# Patient Record
Sex: FEMALE | Race: BLACK OR AFRICAN AMERICAN | NOT HISPANIC OR LATINO | Employment: UNEMPLOYED | URBAN - METROPOLITAN AREA
[De-identification: names, ages, dates, MRNs, and addresses within clinical notes are randomized per-mention and may not be internally consistent; named-entity substitution may affect disease eponyms.]

---

## 2022-08-07 ENCOUNTER — APPOINTMENT (OUTPATIENT)
Dept: RADIOLOGY | Facility: MEDICAL CENTER | Age: 57
End: 2022-08-07
Attending: EMERGENCY MEDICINE
Payer: COMMERCIAL

## 2022-08-07 ENCOUNTER — HOSPITAL ENCOUNTER (EMERGENCY)
Facility: MEDICAL CENTER | Age: 57
End: 2022-08-07
Attending: EMERGENCY MEDICINE
Payer: COMMERCIAL

## 2022-08-07 VITALS
SYSTOLIC BLOOD PRESSURE: 127 MMHG | WEIGHT: 150 LBS | DIASTOLIC BLOOD PRESSURE: 71 MMHG | TEMPERATURE: 99 F | HEART RATE: 53 BPM | OXYGEN SATURATION: 96 % | RESPIRATION RATE: 16 BRPM

## 2022-08-07 DIAGNOSIS — N63.0 BREAST MASS IN FEMALE: ICD-10-CM

## 2022-08-07 LAB
ANION GAP SERPL CALC-SCNC: 11 MMOL/L (ref 7–16)
BASOPHILS # BLD AUTO: 0.2 % (ref 0–1.8)
BASOPHILS # BLD: 0.01 K/UL (ref 0–0.12)
BLOOD CULTURE HOLD CXBCH: NORMAL
BUN SERPL-MCNC: 22 MG/DL (ref 8–22)
CALCIUM SERPL-MCNC: 8.8 MG/DL (ref 8.5–10.5)
CHLORIDE SERPL-SCNC: 106 MMOL/L (ref 96–112)
CO2 SERPL-SCNC: 23 MMOL/L (ref 20–33)
CREAT SERPL-MCNC: 0.75 MG/DL (ref 0.5–1.4)
EOSINOPHIL # BLD AUTO: 0.05 K/UL (ref 0–0.51)
EOSINOPHIL NFR BLD: 1.1 % (ref 0–6.9)
ERYTHROCYTE [DISTWIDTH] IN BLOOD BY AUTOMATED COUNT: 48.8 FL (ref 35.9–50)
GFR SERPLBLD CREATININE-BSD FMLA CKD-EPI: 94 ML/MIN/1.73 M 2
GLUCOSE SERPL-MCNC: 77 MG/DL (ref 65–99)
HCT VFR BLD AUTO: 30.4 % (ref 37–47)
HGB BLD-MCNC: 9.7 G/DL (ref 12–16)
IMM GRANULOCYTES # BLD AUTO: 0.01 K/UL (ref 0–0.11)
IMM GRANULOCYTES NFR BLD AUTO: 0.2 % (ref 0–0.9)
LYMPHOCYTES # BLD AUTO: 1.26 K/UL (ref 1–4.8)
LYMPHOCYTES NFR BLD: 26.8 % (ref 22–41)
MCH RBC QN AUTO: 28.7 PG (ref 27–33)
MCHC RBC AUTO-ENTMCNC: 31.9 G/DL (ref 33.6–35)
MCV RBC AUTO: 89.9 FL (ref 81.4–97.8)
MONOCYTES # BLD AUTO: 0.41 K/UL (ref 0–0.85)
MONOCYTES NFR BLD AUTO: 8.7 % (ref 0–13.4)
NEUTROPHILS # BLD AUTO: 2.96 K/UL (ref 2–7.15)
NEUTROPHILS NFR BLD: 63 % (ref 44–72)
NRBC # BLD AUTO: 0 K/UL
NRBC BLD-RTO: 0 /100 WBC
PLATELET # BLD AUTO: 260 K/UL (ref 164–446)
PMV BLD AUTO: 10.5 FL (ref 9–12.9)
POTASSIUM SERPL-SCNC: 4.1 MMOL/L (ref 3.6–5.5)
RBC # BLD AUTO: 3.38 M/UL (ref 4.2–5.4)
SODIUM SERPL-SCNC: 140 MMOL/L (ref 135–145)
WBC # BLD AUTO: 4.7 K/UL (ref 4.8–10.8)

## 2022-08-07 PROCEDURE — 93971 EXTREMITY STUDY: CPT | Mod: RT

## 2022-08-07 PROCEDURE — 700102 HCHG RX REV CODE 250 W/ 637 OVERRIDE(OP): Performed by: EMERGENCY MEDICINE

## 2022-08-07 PROCEDURE — 85025 COMPLETE CBC W/AUTO DIFF WBC: CPT

## 2022-08-07 PROCEDURE — 80048 BASIC METABOLIC PNL TOTAL CA: CPT

## 2022-08-07 PROCEDURE — 36415 COLL VENOUS BLD VENIPUNCTURE: CPT

## 2022-08-07 PROCEDURE — A9270 NON-COVERED ITEM OR SERVICE: HCPCS | Performed by: EMERGENCY MEDICINE

## 2022-08-07 PROCEDURE — 99284 EMERGENCY DEPT VISIT MOD MDM: CPT

## 2022-08-07 RX ORDER — CEPHALEXIN 500 MG/1
500 CAPSULE ORAL ONCE
Status: COMPLETED | OUTPATIENT
Start: 2022-08-07 | End: 2022-08-07

## 2022-08-07 RX ORDER — RISPERIDONE 2 MG/1
2 TABLET ORAL 2 TIMES DAILY
COMMUNITY

## 2022-08-07 RX ORDER — CEPHALEXIN 500 MG/1
500 CAPSULE ORAL 4 TIMES DAILY
Qty: 28 CAPSULE | Refills: 0 | Status: SHIPPED | OUTPATIENT
Start: 2022-08-07 | End: 2022-08-14

## 2022-08-07 RX ORDER — CEPHALEXIN 500 MG/1
500 CAPSULE ORAL 4 TIMES DAILY
Qty: 28 CAPSULE | Refills: 0 | Status: SHIPPED | OUTPATIENT
Start: 2022-08-07 | End: 2022-08-07 | Stop reason: SDUPTHER

## 2022-08-07 RX ADMIN — CEPHALEXIN 500 MG: 500 CAPSULE ORAL at 18:05

## 2022-08-07 NOTE — ED PROVIDER NOTES
"  ED Provider Note    Scribed for Cely Dey M.D. by Kaylee Potter. 8/7/2022  4:07 PM    Primary care provider: None noted  Means of arrival: Police escort  History obtained from: Patient  History limited by: Poor historian and mental illness    CHIEF COMPLAINT  Chief Complaint   Patient presents with   • Wound Check   • Breast Mass     Pt c/o right breast mass/wound    • Arm Swelling     Right arm/hand swelling        HPI  Audie Martin is a 57 y.o. female who presents via Police escort in [facility], for evaluation of progressively worsening right arm and hand swelling onset 2 weeks ago. The patient has a notable history of Bipolar disorder, but denies taking any daily medications. She has been homeless for many years, until 2 weeks ago when she was taken in by [facility] facility. During a health examination by police 2 weeks ago, a right breast mass was noted, which has been known by the patient. Right breast mass is presumably cancer, onset 9 years ago. The patient denied treatment for the mass multiple times in the past, as well as today. She has some associated itchiness and drainage from the right breast, which has been treated with Benadryl cream by [facility]. There has been no alleviation of symptoms, and patient refuses to keep the bandage on her right breast. When asked if she has a history of cancer or family history she does not answer. She has some associated pain to the right arm up to the shoulder. When asked if she is experiencing any loss of appetite she states that she \"likes Doritos\". She is an otherwise poor historian, not answering questions appropriately when asked. She is able to deny any symptoms of cough, fever, weight loss, vomiting, headache, or abdominal pain. She also admits to having hearing problems.        HPI limited to patient being a poor historian and mental illness.     REVIEW OF SYSTEMS  Pertinent positives include right breat mass with itching and drainage, " right arm swelling, right hand swelling, and right arm pain.   Pertinent negatives include no cough, vomiting, fever, weight loss, headache, or abdominal pain.   See HPI for further details. All other systems are negative.  ROS limited to patient being a poor historian.    PAST MEDICAL HISTORY  Past Medical History:   Diagnosis Date   • Bipolar 1 disorder (HCC)    • Psychosis (HCC)        FAMILY HISTORY  History reviewed. No pertinent family history.    SOCIAL HISTORY  Social History     Tobacco Use   • Smoking status: Never Smoker   • Smokeless tobacco: Never Used   Vaping Use   • Vaping Use: Never used   Substance Use Topics   • Alcohol use: Never   • Drug use: Yes     Comment: thc      Social History     Substance and Sexual Activity   Drug Use Yes    Comment: thc       SURGICAL HISTORY  History reviewed. No pertinent surgical history.    CURRENT MEDICATIONS  Home Medications     Reviewed by Jacey Wilburn R.N. (Registered Nurse) on 08/07/22 at 1557  Med List Status: Partial   Medication Last Dose Status        Patient Sergo Taking any Medications                       ALLERGIES  Allergies   Allergen Reactions   • Penicillin G        PHYSICAL EXAM  VITAL SIGNS: BP (!) 152/85   Pulse 60   Temp 37.3 °C (99.2 °F) (Temporal)   Resp 16   Wt 68 kg (150 lb)   SpO2 94%    Constitutional: Well developed, well nourished; No acute distress; Non-toxic appearance.   HENT: Normocephalic, atraumatic; Bilateral external ears normal; Oropharynx with moist mucous membranes; No erythema or exudates in the posterior oropharynx.   Eyes: PERRL, EOMI, Conjunctiva normal. No discharge.   Neck:  Supple, nontender midline; No stridor; No nuchal rigidity.   Lymphatic: Hard non mobile right axillary adenopathy. No cervical lymphadenopathy noted.   Cardiovascular: Regular rate and rhythm without murmurs, rubs, or gallop.   Thorax & Lungs: Hard nodular mass encompassing majority of right breast with a small amount of drainage coming out  of the 7 o clock position. No respiratory distress, breath sounds clear to auscultation bilaterally without wheezing, rales or rhonchi. Nontender chest wall. No crepitus or subcutaneous air  Abdomen: Soft, nontender, bowel sounds normal. No obvious masses; No pulsatile masses; no rebound, guarding, or peritoneal signs.   Skin: Good color; warm and dry without rash or petechia.  Back: Nontender, No CVA tenderness.   Extremities: Soft and painless edema to the hand and majority of the forearm. No swelling to the upper arm. 2+ pedal pulses. Full ROM to the digits. Good capillary refill. Subtle pealing over the dorsalis surfaces of the index finger Distal radial, dorsalis pedis, posterior tibial pulses are equal bilaterally; Nontender calves or saphenous, No cyanosis, No clubbing.   Musculoskeletal: Good range of motion in all major joints. No tenderness to palpation or major deformities noted.   Neurologic: Alert & oriented x 4, clear speech.       LABS/RADIOLOGY/PROCEDURES  Results for orders placed or performed during the hospital encounter of 08/07/22   CBC WITH DIFFERENTIAL   Result Value Ref Range    WBC 4.7 (L) 4.8 - 10.8 K/uL    RBC 3.38 (L) 4.20 - 5.40 M/uL    Hemoglobin 9.7 (L) 12.0 - 16.0 g/dL    Hematocrit 30.4 (L) 37.0 - 47.0 %    MCV 89.9 81.4 - 97.8 fL    MCH 28.7 27.0 - 33.0 pg    MCHC 31.9 (L) 33.6 - 35.0 g/dL    RDW 48.8 35.9 - 50.0 fL    Platelet Count 260 164 - 446 K/uL    MPV 10.5 9.0 - 12.9 fL    Neutrophils-Polys 63.00 44.00 - 72.00 %    Lymphocytes 26.80 22.00 - 41.00 %    Monocytes 8.70 0.00 - 13.40 %    Eosinophils 1.10 0.00 - 6.90 %    Basophils 0.20 0.00 - 1.80 %    Immature Granulocytes 0.20 0.00 - 0.90 %    Nucleated RBC 0.00 /100 WBC    Neutrophils (Absolute) 2.96 2.00 - 7.15 K/uL    Lymphs (Absolute) 1.26 1.00 - 4.80 K/uL    Monos (Absolute) 0.41 0.00 - 0.85 K/uL    Eos (Absolute) 0.05 0.00 - 0.51 K/uL    Baso (Absolute) 0.01 0.00 - 0.12 K/uL    Immature Granulocytes (abs) 0.01 0.00 -  0.11 K/uL    NRBC (Absolute) 0.00 K/uL   BASIC METABOLIC PANEL   Result Value Ref Range    Sodium 140 135 - 145 mmol/L    Potassium 4.1 3.6 - 5.5 mmol/L    Chloride 106 96 - 112 mmol/L    Co2 23 20 - 33 mmol/L    Glucose 77 65 - 99 mg/dL    Bun 22 8 - 22 mg/dL    Creatinine 0.75 0.50 - 1.40 mg/dL    Calcium 8.8 8.5 - 10.5 mg/dL    Anion Gap 11.0 7.0 - 16.0   Blood Culture,Hold   Result Value Ref Range    Blood Culture Hold Collected    ESTIMATED GFR   Result Value Ref Range    GFR (CKD-EPI) 94 >60 mL/min/1.73 m 2         US-EXTREMITY VENOUS UPPER UNILAT RIGHT   Final Result      US neg for DVT    COURSE & MEDICAL DECISION MAKING  Pertinent Labs & Imaging studies reviewed. (See chart for details)      4:07 PM - Patient seen and examined at bedside. Discussed plan of care, including US to rule out possible clots. Patient agrees to the plan of care. Ordered for US-extremity upper right, CBC with differential, and Basic metabolic panel to evaluate her symptoms.     5:50 PM I discussed the patient's case and the above findings with Pharmacy who advises I trial PO Keflex here.  If tolerated, discharge her with regimen.      5:59 PM - Patient and Police reevaluated at bedside. Discussed non concerning labs and imaging. Discussed plan for discharge; I advised the patient to undergo immediate follow-up with [facility] as soon as possible, and to return to the [facility] ED with any new or worsening symptoms. Patient informed she would be discharged with a Keflex regimen to combat signs of infection to the right breast mass. Patient was given the opportunity for questions. I addressed all questions or concerns at this time and they verbalize agreement to the plan of care.     6:17 PM- Jacey RN here at [facility], contacted the nurse over at [facility].  She says the patient has a known breast cancer.  She came to them from [place] a few weeks ago.  Down in [place] she was evaluated for breast cancer treatment but  refused all treatment.  They are most concerned about the fact that her left hand has been swelling up over the last couple weeks and that she is developed a little bit of drainage at the breast mass.  They are worried about infection.  They say that they will try to see if she will be willing to follow-up with her breast center, but patient has refused all treatment for her known advanced stage IV breast cancer and and feel that patient will likely continue to refuse treatment.    Patient presents to the ER from [facility] in the care of law enforcement.  She arrives in four-point handcuffs with 2 officers.  They sent the patient to the ER for concern over some swelling to her right hand and forearm which is going on over the last 2 weeks.  They are also concerned about some drainage that she is developed from the known right breast cancer mass on the right breast.  Patient has stage IV breast cancer.  This is a known cancer.  She is refused all breast cancer treatment.  She has severe mental illness and is currently at [facility].  They were concerned about the arm swelling and wanted to rule out DVT.  They also were concerned that she was getting infection with the drainage she was experiencing from the mass.  There is a small amount of bloody drainage coming from the 7 o'clock position of this large very obvious breast mass from her stage IV breast cancer.  It does not appear to be foul-smelling or purulent.  She will go home on Keflex.  She tolerated Keflex here in the ER without any signs of allergic reaction.  With respect to the right upper extremity swelling, I think this is secondary to lymphedema.  Her ultrasound is negative.  She does not have any pain in the hand or wrist.  No tenderness in the hand or wrist with palpation.  She is got good pulse.  I think she might benefit from a compression sleeve for lymphedema.  I have referred the patient to the breast center as well as to primary care.   Nursing staff at [facility] says that they are not confident the patient will agree to go to breast center as she is refused all treatment for her stage IV breast cancer thus far.  However, they will try to see if she will go.  Referrals have been placed.  Patient's vital signs are normal stable.  She is not in any distress.  She is not septic or toxic.  I think she is safe and stable for outpatient management and for discharge back to [facility].  She has been given strict return precautions and discharge instructions.  Medical staff at [facility] understand treatment plan and follow-up.    The patient will return for new or worsening symptoms and is stable at the time of discharge.    The patient is referred to a primary physician for blood pressure management, diabetic screening, and for all other preventative health concerns.    DISPOSITION:  Patient will be discharged home in stable condition.    FOLLOW UP:  [facility]  [address]  [place] [address]  Schedule an appointment as soon as possible for a visit in 2 days  If symptoms worsen return to ER    [facility] [address] [facility]  [address], [address]  [place] [id number] [phone]  Schedule an appointment as soon as possible for a visit in 2 days  If symptoms worsen return to ER      OUTPATIENT MEDICATIONS:  New Prescriptions    CEPHALEXIN (KEFLEX) 500 MG CAP    Take 1 Capsule by mouth 4 times a day for 7 days.     FINAL IMPRESSION  1. Breast mass in female Acute       Kaylee JIMENEZ), am scribing for, and in the presence of, Cely Dey M.D..    Electronically signed by: Kaylee Pulliam), 8/7/2022    Cely JIMENEZ M.D. personally performed the services described in this documentation, as scribed by Kaylee Potter in my presence, and it is both accurate and complete.    This dictation has been created using voice recognition software. The accuracy of the dictation is limited by  the abilities of the software. I expect there may be some errors of grammar and possibly content. I made every attempt to manually correct the errors within my dictation. However, errors related to voice recognition software may still exist and should be interpreted within the appropriate context.    The note accurately reflects work and decisions made by me.  Cely Dey M.D.  8/8/2022  1:30 AM

## 2022-08-08 NOTE — ED NOTES
Luis called and BEE villafuerte notified of MD plan for pt follow up. IV access removed - bandage applied. Wristband removed per protocol. All belongings accounted for. Pt discharged with guards

## 2022-08-08 NOTE — DISCHARGE INSTRUCTIONS
Patient will need to follow-up at the [facility] primary care office to establish a primary care physician.  A referral has been placed on her behalf.  She will also need to follow-up at the [facility] for what is likely a very advanced breast cancer of the right breast.  A referral has been placed on her behalf at the [facility] as well.  If you do not hear from the [facility] schedulers within the next several days, please call for appointment times.    Return to the ER for any drainage of pus from the breast, for redness around the breast, fevers over 100.4, shaking chills, nausea, vomiting, worsening swelling to the arm or hand, pain in the arm or hand, chest pains, cough, coughing up phlegm or blood, shortness of breath, or for any concerns.

## 2022-08-12 ENCOUNTER — TELEPHONE (OUTPATIENT)
Dept: HEMATOLOGY ONCOLOGY | Facility: MEDICAL CENTER | Age: 57
End: 2022-08-12
Payer: COMMERCIAL

## 2022-08-12 NOTE — TELEPHONE ENCOUNTER
Mickey Ann RN nurse navigation, regarding pt upcoming appointment with Dr. Parrish. Reviewed pt recent history with [facility]. RN confirmed will discuss with team for upcoming appointment.

## 2022-08-30 ENCOUNTER — HOSPITAL ENCOUNTER (OUTPATIENT)
Dept: HEMATOLOGY ONCOLOGY | Facility: MEDICAL CENTER | Age: 57
End: 2022-08-30
Attending: INTERNAL MEDICINE
Payer: COMMERCIAL

## 2022-08-30 VITALS
DIASTOLIC BLOOD PRESSURE: 98 MMHG | WEIGHT: 145.72 LBS | TEMPERATURE: 99.1 F | RESPIRATION RATE: 18 BRPM | HEIGHT: 69 IN | SYSTOLIC BLOOD PRESSURE: 121 MMHG | OXYGEN SATURATION: 100 % | HEART RATE: 95 BPM | BODY MASS INDEX: 21.58 KG/M2

## 2022-08-30 DIAGNOSIS — C50.911: ICD-10-CM

## 2022-08-30 DIAGNOSIS — C77.9: ICD-10-CM

## 2022-08-30 PROCEDURE — 99204 OFFICE O/P NEW MOD 45 MIN: CPT | Performed by: INTERNAL MEDICINE

## 2022-08-30 PROCEDURE — 99212 OFFICE O/P EST SF 10 MIN: CPT | Performed by: INTERNAL MEDICINE

## 2022-08-30 ASSESSMENT — PAIN SCALES - GENERAL: PAINLEVEL: 10=SEVERE PAIN

## 2022-08-31 DIAGNOSIS — C77.9: ICD-10-CM

## 2022-08-31 DIAGNOSIS — C50.911: ICD-10-CM

## 2022-09-01 NOTE — PROGRESS NOTES
Consult:  Hematology/Oncology 8/31/22      Referring Physician:  Emergency Department  Primary Care:  No primary care provider on file.    Diagnosis: Locally advanced right breast cancer    Chief Complaint:  Growing bleeding mass in right breast    History of Presenting Illness:  Jammie Peoples is a 57 y.o. black female who presented to the emergency room on 8/7/2022 bleeding right breast mass.  This mass had been present for several years at least.  And had recently began to bleed and cause swelling of her arm.  The patient has been homeless for many years until late July when she was taken to [facility].  She is being evaluated by psychiatry and by the legal authorities to decide her ultimate disposition.  She does have a long history of bipolar disorder but is not on any medications currently.  Laboratory in the emergency room showed normal chromic normocytic anemia but was otherwise essentially normal.  She was given Keflex and referred to us.  She continues to have intermittent bleeding from the right breast mass.  She notes that her right breast has been almost completely consumed by the tumor.  A biopsy has not yet been done.      Past Medical History:   Diagnosis Date    Bipolar 1 disorder (HCC)     Psychosis (HCC)        No past surgical history on file.    Social History     Tobacco Use    Smoking status: Never    Smokeless tobacco: Never   Vaping Use    Vaping Use: Never used   Substance Use Topics    Alcohol use: Never    Drug use: Yes     Comment: thc        No family history on file.    Allergies as of 08/30/2022 - Reviewed 08/30/2022   Allergen Reaction Noted    Penicillin g  08/07/2022         Current Outpatient Medications:     risperiDONE (RISPERDAL) 2 MG Tab, Take 2 mg by mouth 2 times a day., Disp: , Rfl:     Review of Systems:  Review of Systems   Constitutional: Negative.    HENT: Negative.     Eyes: Negative.    Respiratory: Negative.     Cardiovascular: Negative.   "  Gastrointestinal: Negative.    Genitourinary: Negative.    Musculoskeletal: Negative.         Swelling of the right arm   Skin: Negative.    Neurological:  Positive for headaches.   Endo/Heme/Allergies: Negative.    Psychiatric/Behavioral:  The patient is nervous/anxious.         Physical Exam:  Vitals:    08/30/22 1406   BP: (!) 121/98   Pulse: 95   Resp: 18   Temp: 37.3 °C (99.1 °F)   TempSrc: Temporal   SpO2: 100%   Weight: 66.1 kg (145 lb 11.6 oz)   Height: 1.753 m (5' 9\")       DESC; KARNOFSKY SCALE WITH ECOG EQUIVALENT: 50, Requires considerable assistance and frequent medical care (ECOG equivalent 2)    DISTRESS LEVEL: moderate distress    Physical Exam  Constitutional:       Appearance: Normal appearance. She is ill-appearing.      Comments: She is somewhat agitated and arrives in handcuffs and leg cuffs accompanied by facility officers   HENT:      Head: Normocephalic.      Mouth/Throat:      Mouth: Mucous membranes are moist.      Pharynx: Oropharynx is clear.   Eyes:      Extraocular Movements: Extraocular movements intact.      Conjunctiva/sclera: Conjunctivae normal.      Pupils: Pupils are equal, round, and reactive to light.   Cardiovascular:      Rate and Rhythm: Normal rate and regular rhythm.      Pulses: Normal pulses.   Pulmonary:      Effort: Pulmonary effort is normal.      Breath sounds: Normal breath sounds.   Chest:   Breasts:     Left: Normal.      Comments: Right breast is almost completely autolyzed with mass overlying the right breast losing a small amount of blood necrotic debris.  There is a fixed right axillary mass with swelling of the right proximal arm and to a lesser extent the distal arm.  Right supraclavicular adenopathy is noted multiple skin nodules are noted consistent with metastatic disease.  Abdominal:      General: Abdomen is flat. Bowel sounds are normal.      Palpations: Abdomen is soft. There is no mass.   Musculoskeletal:         General: Normal range of motion. "   Skin:     General: Skin is warm.   Neurological:      General: No focal deficit present.      Mental Status: She is alert and oriented to person, place, and time.   Psychiatric:         Mood and Affect: Mood normal.         Behavior: Behavior normal.         Labs:  Admission on 08/07/2022, Discharged on 08/07/2022   Component Date Value Ref Range Status    WBC 08/07/2022 4.7 (A) 4.8 - 10.8 K/uL Final    RBC 08/07/2022 3.38 (A) 4.20 - 5.40 M/uL Final    Hemoglobin 08/07/2022 9.7 (A) 12.0 - 16.0 g/dL Final    Hematocrit 08/07/2022 30.4 (A) 37.0 - 47.0 % Final    MCV 08/07/2022 89.9  81.4 - 97.8 fL Final    MCH 08/07/2022 28.7  27.0 - 33.0 pg Final    MCHC 08/07/2022 31.9 (A) 33.6 - 35.0 g/dL Final    RDW 08/07/2022 48.8  35.9 - 50.0 fL Final    Platelet Count 08/07/2022 260  164 - 446 K/uL Final    MPV 08/07/2022 10.5  9.0 - 12.9 fL Final    Neutrophils-Polys 08/07/2022 63.00  44.00 - 72.00 % Final    Lymphocytes 08/07/2022 26.80  22.00 - 41.00 % Final    Monocytes 08/07/2022 8.70  0.00 - 13.40 % Final    Eosinophils 08/07/2022 1.10  0.00 - 6.90 % Final    Basophils 08/07/2022 0.20  0.00 - 1.80 % Final    Immature Granulocytes 08/07/2022 0.20  0.00 - 0.90 % Final    Nucleated RBC 08/07/2022 0.00  /100 WBC Final    Neutrophils (Absolute) 08/07/2022 2.96  2.00 - 7.15 K/uL Final    Includes immature neutrophils, if present.    Lymphs (Absolute) 08/07/2022 1.26  1.00 - 4.80 K/uL Final    Monos (Absolute) 08/07/2022 0.41  0.00 - 0.85 K/uL Final    Eos (Absolute) 08/07/2022 0.05  0.00 - 0.51 K/uL Final    Baso (Absolute) 08/07/2022 0.01  0.00 - 0.12 K/uL Final    Immature Granulocytes (abs) 08/07/2022 0.01  0.00 - 0.11 K/uL Final    NRBC (Absolute) 08/07/2022 0.00  K/uL Final    Sodium 08/07/2022 140  135 - 145 mmol/L Final    Potassium 08/07/2022 4.1  3.6 - 5.5 mmol/L Final    Chloride 08/07/2022 106  96 - 112 mmol/L Final    Co2 08/07/2022 23  20 - 33 mmol/L Final    Glucose 08/07/2022 77  65 - 99 mg/dL Final    Bun  2022 22  8 - 22 mg/dL Final    Creatinine 2022 0.75  0.50 - 1.40 mg/dL Final    Calcium 2022 8.8  8.5 - 10.5 mg/dL Final    Anion Gap 2022 11.0  7.0 - 16.0 Final    Blood Culture Hold 2022 Collected   Final    GFR (CKD-EPI) 2022 94  >60 mL/min/1.73 m 2 Final    Comment: Effective 3/15/2022, estimated Glomerular Filtration Rate  is calculated using race neutral CKD-EPI  equation  per NKF-ASN recommendations.         Imaging:   All listed images below have been independently reviewed by me. I agree with the findings as summarized below:    US-EXTREMITY VENOUS UPPER UNILAT RIGHT    Result Date: 2022   Upper Extremity  Venous Duplex Report  Vascular Laboratory  CONCLUSIONS  No deep or superficial venous abnormalities in the right arm.  Lymphadenopathy.  RVBROCKELVA, ROMIE  Exam Date:     2022 16:43  Room #:     Inpatient  Priority:     Stat  Ht (in):             Wt (lb):  Ordering Physician:        FRANKLIN CRANE  Referring Physician:       [id number] MARIA Pascal  Sonographer:               Marah Hopkins RVNORMAN  Study Type:                Complete Unilateral  Technical Quality:         Adequate  Age:    57    Gender:     F  MRN:    [id number]  :    1965      BSA:  Indications:     Localized swelling, mass and lump, right upper limb,                   Localized edema  CPT Codes:       69944  ICD Codes:       R22.31  R60.0  History:         Swelling and pitting edema of the right arm with right upper                    chest mass. No prior duplex.  Limitations:  PROCEDURES:  Right upper extremity venous duplex imaging.  The following venous structures were evaluated: internal jugular,  subclavian, axillary, brachial, cephalic, basilic, radial, and ulnar veins.  Serial compression, color, and spectral Doppler flow evaluations were  performed.  FINDINGS:  Right upper extremity.  No superficial or deep venous thrombosis.  All veins demonstrate complete color filling and  compressibility with  normal venous flow dynamics including spontaneous flow and respiratory  phasicity.  There is subcutaneous right upper extremity edema noted.  Multiple enlarged lymph nodes in the neck and upper chest with the largest  measuring 3.6 x 2.3 cm.  Flow was evaluated in the contralateral subclavian vein and normal venous  flow dynamics including spontaneous flow and respiratory phasic variation  were demonstrated.  Jerad Goff  (Electronically Signed)  Final Date:      07 August 2022                   17:28       Pathology:      Assessment & Plan:  1.  Neglected right breast cancer stage IV (cT4, CN III, CM 1 with skin metastases) not yet biopsied  2.  Bipolar disorder and psychosis       Plan: We are going to rule out distant metastatic disease with a CT scan of the chest abdomen pelvis and also get a brain CT scan because of her headaches.  We will coordinate the scheduling of this with her facility nurse.  She would not tolerate an MRI on the current situation.  Her overall prognosis is guarded but we will see what the biopsy shows of the skin lesions and then make decision about most appropriate systemic therapy in this difficult circumstance.    Any questions and concerns raised by the patient were answered to the best of my ability. Thank you for allowing me to participate in the care for this patient. Please feel free to contact me for any questions or concerns.     Quincy Parrish M.D.

## 2022-09-06 ASSESSMENT — ENCOUNTER SYMPTOMS
HEADACHES: 1
MUSCULOSKELETAL NEGATIVE: 1
CARDIOVASCULAR NEGATIVE: 1
CONSTITUTIONAL NEGATIVE: 1
RESPIRATORY NEGATIVE: 1
EYES NEGATIVE: 1
NERVOUS/ANXIOUS: 1
GASTROINTESTINAL NEGATIVE: 1

## 2022-10-21 ENCOUNTER — HOSPITAL ENCOUNTER (OUTPATIENT)
Dept: RADIOLOGY | Facility: MEDICAL CENTER | Age: 57
End: 2022-10-21
Attending: INTERNAL MEDICINE
Payer: COMMERCIAL

## 2022-10-21 DIAGNOSIS — C50.911: ICD-10-CM

## 2022-10-21 DIAGNOSIS — C77.9: ICD-10-CM

## 2022-10-21 PROCEDURE — 700117 HCHG RX CONTRAST REV CODE 255: Performed by: INTERNAL MEDICINE

## 2022-10-21 PROCEDURE — 70470 CT HEAD/BRAIN W/O & W/DYE: CPT

## 2022-10-21 PROCEDURE — 71260 CT THORAX DX C+: CPT

## 2022-10-21 RX ADMIN — IOHEXOL 100 ML: 350 INJECTION, SOLUTION INTRAVENOUS at 11:23

## 2022-10-21 RX ADMIN — IOHEXOL 25 ML: 240 INJECTION, SOLUTION INTRATHECAL; INTRAVASCULAR; INTRAVENOUS; ORAL at 11:45

## 2022-11-10 ENCOUNTER — TELEPHONE (OUTPATIENT)
Dept: HEMATOLOGY ONCOLOGY | Facility: MEDICAL CENTER | Age: 57
End: 2022-11-10
Payer: COMMERCIAL

## 2022-11-10 NOTE — TELEPHONE ENCOUNTER
CECELIA for Rach @ [facility] to inquire about setting up a biopsy for this patient. Requested that she call me back to get this situated.

## 2022-11-21 ENCOUNTER — TELEPHONE (OUTPATIENT)
Dept: HEMATOLOGY ONCOLOGY | Facility: MEDICAL CENTER | Age: 57
End: 2022-11-21

## 2022-11-21 NOTE — TELEPHONE ENCOUNTER
[facility] for administration in regards to trying to get this patient scheduled for a biopsy. Requested that they please call back [phone] ([facility]) to try and get this arranged for the patient as we have not seen her since 08/30/22.